# Patient Record
Sex: FEMALE | Race: OTHER | HISPANIC OR LATINO | URBAN - METROPOLITAN AREA
[De-identification: names, ages, dates, MRNs, and addresses within clinical notes are randomized per-mention and may not be internally consistent; named-entity substitution may affect disease eponyms.]

---

## 2021-04-29 ENCOUNTER — EMERGENCY (EMERGENCY)
Facility: HOSPITAL | Age: 56
LOS: 1 days | Discharge: HOME | End: 2021-04-29
Attending: EMERGENCY MEDICINE
Payer: COMMERCIAL

## 2021-04-29 VITALS
OXYGEN SATURATION: 98 % | WEIGHT: 110.01 LBS | DIASTOLIC BLOOD PRESSURE: 72 MMHG | HEART RATE: 73 BPM | TEMPERATURE: 98 F | SYSTOLIC BLOOD PRESSURE: 108 MMHG | RESPIRATION RATE: 16 BRPM

## 2021-04-29 PROCEDURE — 99283 EMERGENCY DEPT VISIT LOW MDM: CPT

## 2021-04-29 RX ADMIN — Medication 200 MILLIGRAM(S): at 18:55

## 2021-04-29 NOTE — ED PROVIDER NOTE - OBJECTIVE STATEMENT
55 y.o female with no PMhx and no PSHx presents to the ED c.o tick bite on the scalp. Patient states she was on [place] and had been working in the garden yesterday when she felt something bite her head. Patient states her  pulled out tick and was able to retrieve the bug from her head. Patient states that she is concerned she has lyme disease and would like someone to open up the bite area to make sure there is no bug left. Patient denies any other acute complaints. NKDA

## 2021-04-29 NOTE — ED PROVIDER NOTE - PATIENT PORTAL LINK FT
You can access the FollowMyHealth Patient Portal offered by [facility] by registering at the following website: [email or web address]. By joining [employer]’s FollowMyHealth portal, you will also be able to view your health information using other applications (apps) compatible with our system.

## 2021-04-29 NOTE — ED PROVIDER NOTE - PROGRESS NOTE DETAILS
Treated prophylactically for Lyme, gave signs and symptoms to watch for. Pt is well appearing walking with steady gait, stable for discharge and follow up without fail with medical doctor. I had a detailed discussion with the patient and/or guardian regarding the historical points, exam findings, and any diagnostic results supporting the discharge diagnosis. Pt educated on care and need for follow up. Strict return instructions and red flag signs and symptoms discussed with patient. Questions answered. Pt shows understanding of discharge information and agrees to follow.

## 2022-11-05 ENCOUNTER — APPOINTMENT (OUTPATIENT)
Dept: ORTHOPEDIC SURGERY | Facility: CLINIC | Age: 57
End: 2022-11-05

## 2022-11-05 VITALS — HEIGHT: 62 IN | WEIGHT: 114 LBS | BODY MASS INDEX: 20.98 KG/M2

## 2022-11-05 PROCEDURE — 99204 OFFICE O/P NEW MOD 45 MIN: CPT | Mod: 25

## 2022-11-05 PROCEDURE — 29130 APPL FINGER SPLINT STATIC: CPT

## 2022-11-05 NOTE — HISTORY OF PRESENT ILLNESS
[6] : 6 [5] : 5 [Dull/Aching] : dull/aching [Ice] : ice [de-identified] : 11/5/22: 56 yo F here with fall and R middle finger now cant straighten out \par \par RHD \par \par healthy otherwise \par \par xrays reviwed:\par R middle finger - no obvious fracture  [] : no [FreeTextEntry1] : LANA CLIFFORD [FreeTextEntry3] : 10/17/22 [FreeTextEntry5] : she fell down stairs [de-identified] : PCP [de-identified] : oct 2022 [de-identified] : XR

## 2022-11-05 NOTE — PHYSICAL EXAM
[FreeTextEntry3] : cant extend the right middle finger through the DIP joint  \par TENDER AROUND THE FINGER

## 2022-11-05 NOTE — DISCUSSION/SUMMARY
[de-identified] : injury to the extensor tender of the middle finger of the middle finger \par mallet finger tx discused \par splint \par 24 hours/day \par fu with hand in [employer]

## 2022-11-29 ENCOUNTER — APPOINTMENT (OUTPATIENT)
Dept: ORTHOPEDIC SURGERY | Facility: CLINIC | Age: 57
End: 2022-11-29

## 2022-11-29 DIAGNOSIS — M20.019 MALLET FINGER OF UNSPECIFIED FINGER(S): ICD-10-CM

## 2022-11-29 PROCEDURE — 99214 OFFICE O/P EST MOD 30 MIN: CPT

## 2022-11-29 NOTE — ASSESSMENT
[FreeTextEntry1] : The condition was explained to the patient.\par Discussed risks and benefits of surgical vs non-surgical treatment of mallet finger. Plan to proceed with non-surgical treatment.\par \par Discussed possible outcomes - extension to neutral, no improvement in extensor lag, or partial improvement in extensor lag (most likely). Discussed risk of skin irritation, breakdown, and sensitivity as well as joint stiffness and loss of ROM due to prolonged immobilization.\par - on exam today, patient's stack splint is slid distally with a gap between the finger tip and the tip of the splint. Applied new stack splint (size 5). Anticipate 6wks full time, 4wks part time. Reviewed splint care and hygiene tips. Demonstrated PIPJ flexion exercises to reduce adjacent joint stiffness.\par - Light activity.\par \par F/u 2wks.

## 2022-11-29 NOTE — IMAGING
[de-identified] : RIGHT HAND\par skin intact. mild swelling of middle finger middle and distal phalanx.\par MF: DIPJ splinted in extension. PIPJ good ext, mild limited flex.\par SILT to median, ulnar, radial distribution. \par brisk cap refill all digits.\par no triggering.\par \par \par XRAY RIGHT MIDDLE FINGER @[facility] 10/15/22: no acute displaced fracture or dislocation. small osteophyte of DIPJ and PIPJ.

## 2022-11-29 NOTE — HISTORY OF PRESENT ILLNESS
[5] : 5 [Dull/Aching] : dull/aching [Household chores] : household chores [Leisure] : leisure [Work] : work [] : yes [de-identified] : 11/29/22: HPI obtained with  - [place].\par 58yo RHD F () presents for RIGHT middle finger pain and deformity. Fell in October, went to  ~1wk later on 10/15/22 => XR @[facility].\par Saw Dr. Day on 11/5/22 => placed in stack splint.\par \par Hx: none. [FreeTextEntry3] : 10/17/2022 [FreeTextEntry5] : NP 57 yr old f presenting with right middle finger pain due to a fall she sustained while walking her dog, states its been 1 month since her injury.  states she was seen by Dr. Day finger splint was placed, pt states has sensitivity to touch and when removing splint pain increases. has tried massaging the finger and placing icy hot still no relief [de-identified] : 11/05/22 [de-identified] : GEN [de-identified] : x-rays [facility]

## 2022-12-13 ENCOUNTER — APPOINTMENT (OUTPATIENT)
Dept: ORTHOPEDIC SURGERY | Facility: CLINIC | Age: 57
End: 2022-12-13

## 2023-05-25 ENCOUNTER — APPOINTMENT (OUTPATIENT)
Dept: SURGERY | Facility: CLINIC | Age: 58
End: 2023-05-25
Payer: COMMERCIAL

## 2023-05-25 VITALS
HEART RATE: 73 BPM | SYSTOLIC BLOOD PRESSURE: 107 MMHG | WEIGHT: 109 LBS | HEIGHT: 58 IN | BODY MASS INDEX: 22.88 KG/M2 | DIASTOLIC BLOOD PRESSURE: 70 MMHG

## 2023-05-25 DIAGNOSIS — Z84.2 FAMILY HISTORY OF OTHER DISEASES OF THE GENITOURINARY SYSTEM: ICD-10-CM

## 2023-05-25 DIAGNOSIS — Z82.49 FAMILY HISTORY OF ISCHEMIC HEART DISEASE AND OTHER DISEASES OF THE CIRCULATORY SYSTEM: ICD-10-CM

## 2023-05-25 DIAGNOSIS — E78.5 HYPERLIPIDEMIA, UNSPECIFIED: ICD-10-CM

## 2023-05-25 DIAGNOSIS — R59.0 LOCALIZED ENLARGED LYMPH NODES: ICD-10-CM

## 2023-05-25 PROCEDURE — 99203 OFFICE O/P NEW LOW 30 MIN: CPT

## 2023-05-25 NOTE — CONSULT LETTER
[Dear  ___] : Dear  [unfilled], [Consult Letter:] : I had the pleasure of evaluating your patient, [unfilled]. [Please see my note below.] : Please see my note below. [Consult Closing:] : Thank you very much for allowing me to participate in the care of this patient.  If you have any questions, please do not hesitate to contact me. [Sincerely,] : Sincerely, [FreeTextEntry3] : Noble Infante MD, FACS

## 2023-05-25 NOTE — HISTORY OF PRESENT ILLNESS
[de-identified] : Patient is a 57 year -old female  who was referred by Dr. Nikolay Stanton with the chief complaint of having a  Right  axilla Lymph node  . She  denies any trauma and She has no nipple discharge. She  denies any fever, night sweats or loss of appetite.    There is no family history of breast carcinoma.   Menarche at age 12, -6 para-5 and her last menstrual period was when she was 29 years old. Patient is on no hormonal replacement therapy.  \par  Patient  sattes that she had a  BL mammogram and it was normal. she brings results of the  BL breast US that was done   on 2023 that was deemed a BIRADS 2 and showed a LN in the right axilla

## 2023-05-25 NOTE — PLAN
[FreeTextEntry1] : Ms. ELIAS  is presenting  today for an evaluation .  she is doing well and offers no complaints.  Results of  her recent  imaging and physical examination findings were discussed in details.   She  was advised to have BL             breast US and Mammogram and return after the tests. Importance of monthly self-breast examination was reinforced.  Patient's questions and concerns addressed to patient's satisfaction.\par

## 2023-05-25 NOTE — PHYSICAL EXAM
[Alert] : alert [Oriented to Person] : oriented to person [Oriented to Place] : oriented to place [Oriented to Time] : oriented to time [Calm] : calm [de-identified] : She  is alert, well-groomed, and in NAD\par   [de-identified] : anicteric.  Nasal mucosa pink, septum midline. Oral mucosa pink.  Tongue midline, Pharynx without exudates.\par   [de-identified] : Neck supple. Trachea midline. Thyroid isthmus barely palpable, lobes not felt.\par   [de-identified] : No chest deformity. Breast are symmetric, Normal contours. No nodules, masses, tenderness, or axillary or supraclavicular  adenopathy. No nipple discharge. no skin retraction \par